# Patient Record
Sex: MALE | Race: WHITE
[De-identification: names, ages, dates, MRNs, and addresses within clinical notes are randomized per-mention and may not be internally consistent; named-entity substitution may affect disease eponyms.]

---

## 2020-11-13 ENCOUNTER — HOSPITAL ENCOUNTER (OUTPATIENT)
Dept: HOSPITAL 63 - LAB | Age: 70
End: 2020-11-13
Attending: NURSE ANESTHETIST, CERTIFIED REGISTERED
Payer: MEDICARE

## 2020-11-13 DIAGNOSIS — Z20.828: ICD-10-CM

## 2020-11-13 DIAGNOSIS — Z12.11: ICD-10-CM

## 2020-11-13 DIAGNOSIS — Z01.812: Primary | ICD-10-CM

## 2020-11-13 PROCEDURE — U0003 INFECTIOUS AGENT DETECTION BY NUCLEIC ACID (DNA OR RNA); SEVERE ACUTE RESPIRATORY SYNDROME CORONAVIRUS 2 (SARS-COV-2) (CORONAVIRUS DISEASE [COVID-19]), AMPLIFIED PROBE TECHNIQUE, MAKING USE OF HIGH THROUGHPUT TECHNOLOGIES AS DESCRIBED BY CMS-2020-01-R: HCPCS

## 2020-11-13 PROCEDURE — C9803 HOPD COVID-19 SPEC COLLECT: HCPCS

## 2020-11-17 ENCOUNTER — HOSPITAL ENCOUNTER (OUTPATIENT)
Dept: HOSPITAL 63 - SURG | Age: 70
Discharge: HOME | End: 2020-11-17
Attending: INTERNAL MEDICINE
Payer: MEDICARE

## 2020-11-17 VITALS
DIASTOLIC BLOOD PRESSURE: 75 MMHG | DIASTOLIC BLOOD PRESSURE: 75 MMHG | SYSTOLIC BLOOD PRESSURE: 107 MMHG | DIASTOLIC BLOOD PRESSURE: 75 MMHG | SYSTOLIC BLOOD PRESSURE: 107 MMHG | SYSTOLIC BLOOD PRESSURE: 107 MMHG

## 2020-11-17 DIAGNOSIS — D12.2: ICD-10-CM

## 2020-11-17 DIAGNOSIS — Z79.82: ICD-10-CM

## 2020-11-17 DIAGNOSIS — D12.3: ICD-10-CM

## 2020-11-17 DIAGNOSIS — Z79.899: ICD-10-CM

## 2020-11-17 DIAGNOSIS — Z86.010: ICD-10-CM

## 2020-11-17 DIAGNOSIS — D12.4: ICD-10-CM

## 2020-11-17 DIAGNOSIS — Z12.11: Primary | ICD-10-CM

## 2020-11-17 DIAGNOSIS — K64.8: ICD-10-CM

## 2020-11-17 DIAGNOSIS — Z88.8: ICD-10-CM

## 2020-11-17 PROCEDURE — 45385 COLONOSCOPY W/LESION REMOVAL: CPT

## 2020-11-17 PROCEDURE — 88305 TISSUE EXAM BY PATHOLOGIST: CPT

## 2020-11-17 PROCEDURE — 64451 NJX AA&/STRD NRV NRVTG SI JT: CPT

## 2020-11-17 PROCEDURE — 45380 COLONOSCOPY AND BIOPSY: CPT

## 2020-11-19 NOTE — PATHOLOGY
[facility] Accession Number: [id number]

.                                                                01

Material submitted:                                        .

PART A: colon - DESCENDING POLYP. Modifiers: descending

PART B: colon - TRANSVERSE POLYP. Modifiers: transverse

PART C: colon - ASCENDING POLYP. Modifiers: ascending

.                                                                02

**********************************************************************

Diagnosis:

A. Colon biopsies, descending colon polyp:

- Tubular adenoma.

.

B. Colon biopsies, transverse colon polyps:

- Segment (1) of tubular adenoma.

- Segments (2) of sessile serrated polyp/adenoma.

.

C. Colon biopsies, ascending colon polyp:

- Tubular adenoma.

([facility]:pit 11/19/2020)

[facility]  11/19/2020  1455 Local

**********************************************************************

.                                                                02

Comment:

There is no high-grade dysplasia or evidence of malignancy.

([facility]:pit 11/19/2020)

.                                                                02

Electronically signed:                                     .

Trell Rosales MD, Pathologist

NPI- [id number]

.                                                                01

Gross description:                                         .

A.  The specimen is received in formalin, labeled "Bjorn Zelaya,

descending colon polyp".  Received are two segments of pale tan soft

tissue ranging in size from 0.3 to 0.7 cm in maximum dimensions.  The

specimen is submitted entirely in cassette A1.

.

B.  The specimen is received in formalin, labeled "Bjorn Zelaya,

transverse polyp".  Received are four segments of pale tan soft tissue

ranging in size from 0.3 to 0.4 cm in maximum dimensions.  The specimen is

submitted entirely in cassette B1.

.

C.  The specimen is received in formalin, labeled "Bjorn Zelaya, ascending

polyp".  Received are multiple segments of pale tan soft tissue ranging in

size from 0.3 to 0.6 cm in maximum dimensions.  The specimen is submitted

entirely in cassette C1.

([facility]; 11/18/2020)

[facility]  11/18/2020  1420 Local

.                                                                02

Pathologist provided ICD-10:

D12.4, D12.3, D12.2

.                                                                02

CPT                                                        .

962815, [id number], [id number]

Specimen Comment: A courtesy copy of this report has been sent to [phone], [phone]

Specimen Comment: [id number]

Specimen Comment: Report sent to  / DR FONG

***Performed at:  01

   [facility]

   [address] [place] [address] [address], [place]  [id number]

   MD Eran Pantoja MD Phone:  [phone]

***Performed at:  02

   [facility]

   [address], [place]  [id number]

   MD Trell Rosales MD Phone:  [phone]

## 2021-09-29 ENCOUNTER — HOSPITAL ENCOUNTER (OUTPATIENT)
Dept: HOSPITAL 63 - CT | Age: 71
End: 2021-09-29
Attending: FAMILY MEDICINE
Payer: MEDICARE

## 2021-09-29 DIAGNOSIS — I70.0: ICD-10-CM

## 2021-09-29 DIAGNOSIS — Z12.2: Primary | ICD-10-CM

## 2021-09-29 DIAGNOSIS — Z13.6: ICD-10-CM

## 2021-09-29 DIAGNOSIS — J43.2: ICD-10-CM

## 2021-09-29 DIAGNOSIS — F17.210: ICD-10-CM

## 2021-09-29 PROCEDURE — 76770 US EXAM ABDO BACK WALL COMP: CPT

## 2021-09-29 PROCEDURE — 71271 CT THORAX LUNG CANCER SCR C-: CPT

## 2021-09-29 NOTE — RAD
EXAM: CT CHEST WITHOUT CONTRAST (LDCT LUNG CANCER SCREENING).



HISTORY: Risk factors for pulmonary malignancy. Tobacco dependence.



TECHNIQUE: CT of the chest was performed without intravenous contrast using a low-dose lung screening
 protocol. Findings analysis is based on ACR Lung-RADS v1.1. *One or more of the following individual
ized dose reduction techniques were utilized for this examination:  

1. Automated exposure control.  

2. Adjustment of the mA and/or kV according to patient size.  

3. Use of iterative reconstruction technique.





COMPARISON: None. 



FINDINGS:



Nodules: No clinically suspicious nodules.



Other findings: Images of the upper abdomen reveal no acute abnormality. Bone windows reveal no suspi
cious lesions.



There are no pathologically enlarged mediastinal or axillary lymph nodes. There is no pleural or concha
cardial effusion. The heart is not enlarged.



No pulmonary parenchymal process is identified. Mild centrilobular pulmonary emphysema.



IMPRESSION/RECOMMENDATION:

1. ACR Lung-RADS category: 1, negative .

2. Continue annual screening with LDCT in 12 months.

3. Mild centrilobular pulmonary emphysema.



Electronically signed by: Johanny Elliott MD (9/29/2021 10:45 AM) [facility]

## 2021-09-29 NOTE — RAD
CLINICAL HISTORY: Reason: SCREENING FOR AAA, HX OF SMOKING



COMPARISON: None available.



TECHNIQUE: The abdominal aorta was examined from the diaphragm to the proximal common iliac arteries.




FINDINGS:

There is plaque in the abdominal aorta, greatest mid to distally. No aortic aneurysm seen.



The proximal abdominal aorta measures 2.6 centimeters in AP dimension.



The mid abdominal aorta measures 1.7 centimeters in AP dimension.



The distal abdominal aorta measures 1.3 centimeters in AP dimension.



The right common iliac artery measures 1.3 x 1.2 cm in diameter.  The left common iliac artery measur
es 1.3 x 1.2 cm diameter.



IMPRESSION:

No aortic aneurysm.



Electronically signed by: Brenda Estrada MD (9/29/2021 4:18 PM) [id number]